# Patient Record
Sex: FEMALE | Race: AMERICAN INDIAN OR ALASKA NATIVE
[De-identification: names, ages, dates, MRNs, and addresses within clinical notes are randomized per-mention and may not be internally consistent; named-entity substitution may affect disease eponyms.]

---

## 2018-05-07 ENCOUNTER — HOSPITAL ENCOUNTER (OUTPATIENT)
Dept: HOSPITAL 89 - LAB | Age: 33
End: 2018-05-07
Attending: OBSTETRICS & GYNECOLOGY
Payer: COMMERCIAL

## 2018-05-07 DIAGNOSIS — Z31.89: Primary | ICD-10-CM

## 2018-05-07 PROCEDURE — 84443 ASSAY THYROID STIM HORMONE: CPT

## 2018-05-07 PROCEDURE — 83002 ASSAY OF GONADOTROPIN (LH): CPT

## 2018-05-07 PROCEDURE — 84146 ASSAY OF PROLACTIN: CPT

## 2018-05-07 PROCEDURE — 36415 COLL VENOUS BLD VENIPUNCTURE: CPT

## 2018-05-07 PROCEDURE — 83001 ASSAY OF GONADOTROPIN (FSH): CPT

## 2018-05-07 PROCEDURE — 83520 IMMUNOASSAY QUANT NOS NONAB: CPT

## 2018-05-07 PROCEDURE — 82670 ASSAY OF TOTAL ESTRADIOL: CPT

## 2018-06-11 ENCOUNTER — HOSPITAL ENCOUNTER (OUTPATIENT)
Dept: HOSPITAL 89 - RAD | Age: 33
End: 2018-06-11
Attending: OBSTETRICS & GYNECOLOGY
Payer: COMMERCIAL

## 2018-06-11 DIAGNOSIS — R94.6: ICD-10-CM

## 2018-06-11 DIAGNOSIS — Z31.89: Primary | ICD-10-CM

## 2018-06-11 PROCEDURE — 36415 COLL VENOUS BLD VENIPUNCTURE: CPT

## 2018-06-11 PROCEDURE — 76857 US EXAM PELVIC LIMITED: CPT

## 2018-06-11 PROCEDURE — 84443 ASSAY THYROID STIM HORMONE: CPT

## 2018-06-11 NOTE — RADIOLOGY IMAGING REPORT
FACILITY: [facility] - [place] 

 

PATIENT NAME: Ronit Palmer

: 1985

MR: [id number]

V: [id number]

EXAM DATE: 

ORDERING PHYSICIAN: FABIOLA BROOKS

TECHNOLOGIST: 

 

Location: [facility] [place]

Patient: Ronit Palmer

: 1985

MRN: [id number]

Visit/Account:[id number]

Date of Sevice:  2018

 

ACCESSION #: [id number]

 

PELVIC LTD OR F/U

 

HISTORY:  Follicle check and endometrial lining only

 

TECHNIQUE:  Transvaginal ultrasound pelvis.

 

COMPARISON:  None.

 

FINDINGS:

Uterus: ; 7.3 cm length x 3.3 cm AP x cm transverse.

Myometrium: Lung the right-sided uterine fundus is a 3.8 x 2.9 x 4.2 cm fibroid.

Endometrium: There is a 1 x 0.5 cm ovoid echogenic structure centrally within the endometrial canal.;
 double thickness 11 mm.

Cervix: Grossly negative.

 

Ovaries:

     Right - 1.6 x 2.6 x 2.2 cm.  Six follicles are identified in the right ovary.  The three largest
 are as follows: 1.3 x 0.8 x 1 cm; 1.0 x 0.7 x 0.9 cm; 0.5 x 0.3 x 0.4 cm

     Left - 2 x 2.5 x 1.9 cm.  Follicles are identified in the left ovary.  Three largest are as foll
ows 1.1 x 0.9 x 1 cm; 0.7 x 0.6 x 0.8 cm; 0.5 x 0.5 x 0.6 cm

Blood flow is documented in each ovary by duplex Doppler ultrasound.

 

Adnexa: Grossly unremarkable.

Free pelvic fluid: None.

 

 

IMPRESSION:

There are six follicles identified in the right ovary and eight on the left ovary.  The three largest
 in both ovaries are described above

 

4.2 cm right-sided fundal fibroid

 

There is a 1 x 0.5 cm ovoid echogenic structure centrally within the endometrial canal.  Although thi
s could represent mucous or other secretions possibility of a solid mass such as a polyp is not total
ly excluded.  (This is best seen on images four and five)

 

Report Dictated By: Trish Harmon MD at 2018 10:05 AM

 

Report E-Signed By: Trish Harmon MD  at 2018 10:53 AM

 

WSN:SERA

## 2018-07-24 ENCOUNTER — HOSPITAL ENCOUNTER (OUTPATIENT)
Dept: HOSPITAL 89 - LAB | Age: 33
End: 2018-07-24
Attending: OBSTETRICS & GYNECOLOGY
Payer: COMMERCIAL

## 2018-07-24 DIAGNOSIS — E03.9: Primary | ICD-10-CM

## 2018-07-24 PROCEDURE — 36415 COLL VENOUS BLD VENIPUNCTURE: CPT

## 2018-07-24 PROCEDURE — 84443 ASSAY THYROID STIM HORMONE: CPT

## 2018-08-28 ENCOUNTER — HOSPITAL ENCOUNTER (OUTPATIENT)
Dept: HOSPITAL 89 - LAB | Age: 33
End: 2018-08-28
Attending: OBSTETRICS & GYNECOLOGY
Payer: COMMERCIAL

## 2018-08-28 DIAGNOSIS — E03.9: Primary | ICD-10-CM

## 2018-08-28 PROCEDURE — 36415 COLL VENOUS BLD VENIPUNCTURE: CPT

## 2018-08-28 PROCEDURE — 84443 ASSAY THYROID STIM HORMONE: CPT

## 2018-10-11 ENCOUNTER — HOSPITAL ENCOUNTER (OUTPATIENT)
Dept: HOSPITAL 89 - LAB | Age: 33
End: 2018-10-11
Attending: OBSTETRICS & GYNECOLOGY
Payer: COMMERCIAL

## 2018-10-11 DIAGNOSIS — E03.9: Primary | ICD-10-CM

## 2018-10-11 PROCEDURE — 84443 ASSAY THYROID STIM HORMONE: CPT

## 2018-10-11 PROCEDURE — 36415 COLL VENOUS BLD VENIPUNCTURE: CPT

## 2019-01-15 ENCOUNTER — HOSPITAL ENCOUNTER (OUTPATIENT)
Dept: HOSPITAL 89 - LAB | Age: 34
End: 2019-01-15
Attending: NURSE PRACTITIONER
Payer: COMMERCIAL

## 2019-01-15 DIAGNOSIS — Z11.3: ICD-10-CM

## 2019-01-15 DIAGNOSIS — Z13.29: ICD-10-CM

## 2019-01-15 DIAGNOSIS — Z13.0: ICD-10-CM

## 2019-01-15 DIAGNOSIS — Z11.8: ICD-10-CM

## 2019-01-15 DIAGNOSIS — Z11.59: ICD-10-CM

## 2019-01-15 DIAGNOSIS — Z01.83: Primary | ICD-10-CM

## 2019-01-15 LAB — PLATELET COUNT, AUTOMATED: 246 K/UL (ref 150–450)

## 2019-01-15 PROCEDURE — 85027 COMPLETE CBC AUTOMATED: CPT

## 2019-01-15 PROCEDURE — 86703 HIV-1/HIV-2 1 RESULT ANTBDY: CPT

## 2019-01-15 PROCEDURE — 36415 COLL VENOUS BLD VENIPUNCTURE: CPT

## 2019-01-15 PROCEDURE — 84144 ASSAY OF PROGESTERONE: CPT

## 2019-01-15 PROCEDURE — 86592 SYPHILIS TEST NON-TREP QUAL: CPT

## 2019-01-15 PROCEDURE — 86850 RBC ANTIBODY SCREEN: CPT

## 2019-01-15 PROCEDURE — 86803 HEPATITIS C AB TEST: CPT

## 2019-01-15 PROCEDURE — 87340 HEPATITIS B SURFACE AG IA: CPT

## 2019-01-15 PROCEDURE — 86762 RUBELLA ANTIBODY: CPT

## 2019-01-15 PROCEDURE — 86901 BLOOD TYPING SEROLOGIC RH(D): CPT

## 2019-01-15 PROCEDURE — 86900 BLOOD TYPING SEROLOGIC ABO: CPT

## 2019-03-18 ENCOUNTER — HOSPITAL ENCOUNTER (OUTPATIENT)
Dept: HOSPITAL 89 - LAB | Age: 34
End: 2019-03-18
Attending: NURSE PRACTITIONER
Payer: COMMERCIAL

## 2019-03-18 DIAGNOSIS — Z31.41: Primary | ICD-10-CM

## 2019-03-18 PROCEDURE — 83520 IMMUNOASSAY QUANT NOS NONAB: CPT

## 2019-03-18 PROCEDURE — 36415 COLL VENOUS BLD VENIPUNCTURE: CPT

## 2019-06-21 ENCOUNTER — HOSPITAL ENCOUNTER (OUTPATIENT)
Dept: HOSPITAL 89 - LAB | Age: 34
End: 2019-06-21
Payer: COMMERCIAL

## 2019-06-21 DIAGNOSIS — Z13.29: Primary | ICD-10-CM

## 2019-06-21 DIAGNOSIS — Z11.59: ICD-10-CM

## 2019-06-21 PROCEDURE — 86787 VARICELLA-ZOSTER ANTIBODY: CPT

## 2019-06-21 PROCEDURE — 84443 ASSAY THYROID STIM HORMONE: CPT

## 2019-06-21 PROCEDURE — 84439 ASSAY OF FREE THYROXINE: CPT

## 2019-06-21 PROCEDURE — 36415 COLL VENOUS BLD VENIPUNCTURE: CPT

## 2019-06-21 PROCEDURE — 84146 ASSAY OF PROLACTIN: CPT
